# Patient Record
(demographics unavailable — no encounter records)

---

## 2025-04-09 NOTE — CONSULT LETTER
[Dear  ___] : Dear ~BEAU, [Courtesy Letter:] : I had the pleasure of seeing your patient, [unfilled], in my office today. [Please see my note below.] : Please see my note below. [Consult Closing:] : Thank you very much for allowing me to participate in the care of this patient.  If you have any questions, please do not hesitate to contact me. [Sincerely,] : Sincerely, [FreeTextEntry2] : Dr. Dat Olivarez [FreeTextEntry3] : Ike Prieto M.D., F.JESSICA.C.S., F.A.S.C.R.S. Chief Colorectal Clinical Services, [facility]

## 2025-04-09 NOTE — HISTORY OF PRESENT ILLNESS
[FreeTextEntry1] : Cara is a 58 y/o female being seen for consultation, hemorrhoids  h/o Factor XI deficiency  s/p RBLs ~ 4-5 times by Dr. Jose Eduardo Francis not in the last 10 years  Colonoscopy by Dr. Olivarez 9/12/24 (screening for colorectal malignant neoplasm) - The entire examined colon is normal on direct and retroflexion views.  No specimens collected.    Today pt reports feeling anal pain, swelling tissue, burning sensations since August of last year.  Formed BMs daily, rare straining, takes Miralax PRN, once weekly bleeding onto tp and toilet bowl.  Does feel swelling and prolapsing tissues.  No episodes of incontinence of stool or flatus but has leakage of stool on and off.  Good appetite.  No c/o nausea or vomiting.  Denies fever and chills.  Not on anticoagulants.

## 2025-04-09 NOTE — ASSESSMENT
[FreeTextEntry1] : I have seen and evaluated the patient, and I have corroborated all nursing input into this note.  The patient has hemorrhoid prolapse.  She has had banding in the past which has been helpful.  I discussed the option of rubber band ligation again.  The patient understands the increased risk of bleeding because of her factor XI deficiency.  She will take TXA as recommended by her hematologist.  Indications, risks, benefits, alternatives reviewed including but not limited to bleeding, infection, and failure.  All questions were answered.  The left lateral hemorrhoid was banded.  Post band instruction sheet reviewed.  The patient will return to my office for further bands if her symptoms persist.

## 2025-04-09 NOTE — PHYSICAL EXAM
[Normal Breath Sounds] : Normal breath sounds [Normal Heart Sounds] : normal heart sounds [Alert] : alert [Oriented to Person] : oriented to person [Oriented to Place] : oriented to place [Oriented to Time] : oriented to time [Calm] : calm [de-identified] : WNL [de-identified] : WNL [de-identified] : ASPENL [de-identified] : WNL [de-identified] : WNL [FreeTextEntry1] : Perianal inspection demonstrated a right anterior tag.  Digital exam and anoscopy demonstrated moderate internal hemorrhoid enlargement.  The left lateral was the largest.  The right anterior was the second largest.  Anoscopy performed to evaluate internal hemorrhoids.  No sedation required.

## 2025-04-09 NOTE — CONSULT LETTER
[Dear  ___] : Dear ~BEAU, [Courtesy Letter:] : I had the pleasure of seeing your patient, [unfilled], in my office today. [Please see my note below.] : Please see my note below. [Consult Closing:] : Thank you very much for allowing me to participate in the care of this patient.  If you have any questions, please do not hesitate to contact me. [Sincerely,] : Sincerely, [FreeTextEntry2] : Dr. Dat Olivarez [FreeTextEntry3] : Ike Prieto M.D., F.JESSICA.C.S., F.A.S.C.R.S. Chief Colorectal Clinical Services, [facility]

## 2025-04-09 NOTE — CONSULT LETTER
[Dear  ___] : Dear ~BEAU, [Courtesy Letter:] : I had the pleasure of seeing your patient, [unfilled], in my office today. [Please see my note below.] : Please see my note below. [Consult Closing:] : Thank you very much for allowing me to participate in the care of this patient.  If you have any questions, please do not hesitate to contact me. [Sincerely,] : Sincerely, [FreeTextEntry2] : Dr. Dat Olivarez [FreeTextEntry3] : Ike Prieto M.D., F.JESSICA.C.S., F.A.S.C.R.S. Chief Colorectal Clinical Services, [facility]

## 2025-04-09 NOTE — PHYSICAL EXAM
[Normal Breath Sounds] : Normal breath sounds [Normal Heart Sounds] : normal heart sounds [Alert] : alert [Oriented to Person] : oriented to person [Oriented to Place] : oriented to place [Oriented to Time] : oriented to time [Calm] : calm [de-identified] : WNL [de-identified] : WNL [de-identified] : ASPENL [de-identified] : WNL [de-identified] : WNL [FreeTextEntry1] : Perianal inspection demonstrated a right anterior tag.  Digital exam and anoscopy demonstrated moderate internal hemorrhoid enlargement.  The left lateral was the largest.  The right anterior was the second largest.  Anoscopy performed to evaluate internal hemorrhoids.  No sedation required.

## 2025-04-09 NOTE — HISTORY OF PRESENT ILLNESS
[FreeTextEntry1] : Cara is a 56 y/o female being seen for consultation, hemorrhoids  h/o Factor XI deficiency  s/p RBLs ~ 4-5 times by Dr. Jose Eduardo Francis not in the last 10 years  Colonoscopy by Dr. Olivarez 9/12/24 (screening for colorectal malignant neoplasm) - The entire examined colon is normal on direct and retroflexion views.  No specimens collected.    Today pt reports feeling anal pain, swelling tissue, burning sensations since August of last year.  Formed BMs daily, rare straining, takes Miralax PRN, once weekly bleeding onto tp and toilet bowl.  Does feel swelling and prolapsing tissues.  No episodes of incontinence of stool or flatus but has leakage of stool on and off.  Good appetite.  No c/o nausea or vomiting.  Denies fever and chills.  Not on anticoagulants.

## 2025-04-09 NOTE — PHYSICAL EXAM
[Normal Breath Sounds] : Normal breath sounds [Normal Heart Sounds] : normal heart sounds [Alert] : alert [Oriented to Person] : oriented to person [Oriented to Place] : oriented to place [Oriented to Time] : oriented to time [Calm] : calm [de-identified] : WNL [de-identified] : WNL [de-identified] : ASPENL [de-identified] : WNL [de-identified] : WNL [FreeTextEntry1] : Perianal inspection demonstrated a right anterior tag.  Digital exam and anoscopy demonstrated moderate internal hemorrhoid enlargement.  The left lateral was the largest.  The right anterior was the second largest.  Anoscopy performed to evaluate internal hemorrhoids.  No sedation required.